# Patient Record
Sex: MALE | Race: WHITE | NOT HISPANIC OR LATINO | Employment: OTHER | URBAN - METROPOLITAN AREA
[De-identification: names, ages, dates, MRNs, and addresses within clinical notes are randomized per-mention and may not be internally consistent; named-entity substitution may affect disease eponyms.]

---

## 2017-08-17 ENCOUNTER — NEW PATIENT (OUTPATIENT)
Dept: URBAN - METROPOLITAN AREA CLINIC 11 | Facility: CLINIC | Age: 72
End: 2017-08-17

## 2017-08-17 VITALS — HEIGHT: 60 IN | DIASTOLIC BLOOD PRESSURE: 60 MMHG | SYSTOLIC BLOOD PRESSURE: 138 MMHG

## 2017-08-17 ASSESSMENT — VISUAL ACUITY
OD_CC: 20/30-2
OS_CC: 20/25

## 2017-08-17 ASSESSMENT — TONOMETRY
OD_IOP_MMHG: 14
OS_IOP_MMHG: 15

## 2017-12-08 NOTE — PATIENT DISCUSSION
ANGELICA OU:  PRESCRIBED UV PROTECTION TO SLOW GROWTH. PRESCRIBE ARTIFICAL TEARS TO INCREASE COMFORT.

## 2017-12-08 NOTE — PATIENT DISCUSSION
[phone]/20&nbsp;SN &nbsp; &nbsp; clk

## 2018-12-20 NOTE — PATIENT DISCUSSION
[phone] -2&nbsp;SN &nbsp; &nbsp; clk

## 2020-02-05 NOTE — PATIENT DISCUSSION
[place]  for [facility]+[phone]&nbsp;SN &nbsp; &nbsp; cld

## 2020-05-18 ENCOUNTER — IMPORTED ENCOUNTER (OUTPATIENT)
Dept: URBAN - METROPOLITAN AREA CLINIC 9 | Facility: CLINIC | Age: 75
End: 2020-05-18

## 2020-07-28 ENCOUNTER — IMPORTED ENCOUNTER (OUTPATIENT)
Dept: URBAN - METROPOLITAN AREA CLINIC 9 | Facility: CLINIC | Age: 75
End: 2020-07-28

## 2020-08-13 ENCOUNTER — IMPORTED ENCOUNTER (OUTPATIENT)
Dept: URBAN - METROPOLITAN AREA CLINIC 9 | Facility: CLINIC | Age: 75
End: 2020-08-13

## 2020-08-19 ENCOUNTER — IMPORTED ENCOUNTER (OUTPATIENT)
Dept: URBAN - METROPOLITAN AREA CLINIC 9 | Facility: CLINIC | Age: 75
End: 2020-08-19

## 2020-09-14 ENCOUNTER — IMPORTED ENCOUNTER (OUTPATIENT)
Dept: URBAN - METROPOLITAN AREA CLINIC 9 | Facility: CLINIC | Age: 75
End: 2020-09-14

## 2020-10-19 ENCOUNTER — IMPORTED ENCOUNTER (OUTPATIENT)
Dept: URBAN - METROPOLITAN AREA CLINIC 9 | Facility: CLINIC | Age: 75
End: 2020-10-19

## 2021-05-06 ENCOUNTER — IMPORTED ENCOUNTER (OUTPATIENT)
Dept: URBAN - METROPOLITAN AREA CLINIC 9 | Facility: CLINIC | Age: 76
End: 2021-05-06

## 2021-06-17 ENCOUNTER — IMPORTED ENCOUNTER (OUTPATIENT)
Dept: URBAN - METROPOLITAN AREA CLINIC 9 | Facility: CLINIC | Age: 76
End: 2021-06-17

## 2021-06-17 PROBLEM — Z96.1: Noted: 2021-06-17

## 2021-10-11 ENCOUNTER — FOLLOW UP (OUTPATIENT)
Dept: URBAN - METROPOLITAN AREA CLINIC 6 | Facility: CLINIC | Age: 76
End: 2021-10-11

## 2021-10-11 DIAGNOSIS — H16.223: ICD-10-CM

## 2021-10-11 DIAGNOSIS — Z96.1: ICD-10-CM

## 2021-10-11 DIAGNOSIS — H43.811: ICD-10-CM

## 2021-10-11 PROCEDURE — 92014 COMPRE OPH EXAM EST PT 1/>: CPT

## 2021-10-11 ASSESSMENT — TONOMETRY
OS_IOP_MMHG: 11
OD_IOP_MMHG: 14

## 2021-10-16 ASSESSMENT — VISUAL ACUITY
OD_SC: 20/25 - SN
OD_CC: 20/30 -2 SN
OS_CC: 20/20 SN
OD_SC: 20/40 SN
OS_CC: 20/20 - SN
OS_SC: 20/25 -2 SN
OS_CC: 20/25 -2 SN
OD_CC: 20/40 -2 SN
OS_CC: 20/30 -2 SN
OD_CC: 20/20 SN
OD_SC: 20/80 SN
OD_SC: 20/25 -2 SN
OD_CC: 20/25 -2 SN
OD_CC: 20/30 SN
OD_CC: 20/20 -3 SN
OS_CC: 20/30 SN
OD_PH: 20/30 SN

## 2021-10-16 ASSESSMENT — TONOMETRY
OD_IOP_MMHG: 9
OS_IOP_MMHG: 8
OD_IOP_MMHG: 12
OD_IOP_MMHG: 8
OD_IOP_MMHG: 7
OS_IOP_MMHG: 6
OS_IOP_MMHG: 12
OD_IOP_MMHG: 13
OS_IOP_MMHG: 6
OD_IOP_MMHG: 10
OS_IOP_MMHG: 10
OD_IOP_MMHG: 6

## 2022-04-04 NOTE — PATIENT DISCUSSION
PM[facility] discussed Lasik with patient, will refer to Dr [facility] for evaluation.

## 2022-06-20 RX ORDER — ALLOPURINOL 100 MG/1
TABLET ORAL
COMMUNITY

## 2022-06-20 RX ORDER — ROSUVASTATIN CALCIUM 40 MG/1
TABLET, COATED ORAL
COMMUNITY

## 2022-06-20 RX ORDER — PREGABALIN 25 MG/1
1 CAPSULE ORAL
COMMUNITY
Start: 2022-03-23 | End: 2022-09-21

## 2022-06-20 RX ORDER — AMLODIPINE BESYLATE 5 MG/1
TABLET ORAL
COMMUNITY

## 2022-06-20 RX ORDER — DOXAZOSIN MESYLATE 4 MG/1
TABLET ORAL
COMMUNITY

## 2022-06-20 RX ORDER — PREDNISONE 1 MG/1
TABLET ORAL
COMMUNITY
End: 2022-09-21

## 2022-06-20 RX ORDER — AMOXICILLIN 500 MG/1
4 CAPSULE ORAL
COMMUNITY
End: 2022-09-21

## 2022-06-20 RX ORDER — GLIMEPIRIDE 4 MG/1
TABLET ORAL
COMMUNITY
End: 2022-09-21

## 2022-06-20 RX ORDER — MONTELUKAST SODIUM 10 MG/1
TABLET ORAL
COMMUNITY
End: 2022-09-21

## 2022-06-20 RX ORDER — ISOSORBIDE MONONITRATE 60 MG/1
TABLET, EXTENDED RELEASE ORAL
COMMUNITY
End: 2022-09-21 | Stop reason: SDUPTHER

## 2022-06-20 RX ORDER — CLONAZEPAM 1 MG/1
TABLET ORAL
COMMUNITY

## 2022-06-20 RX ORDER — METOLAZONE 2.5 MG/1
TABLET ORAL
COMMUNITY

## 2022-06-20 RX ORDER — POTASSIUM CHLORIDE 20 MEQ/1
TABLET, EXTENDED RELEASE ORAL
COMMUNITY

## 2022-06-20 RX ORDER — FUROSEMIDE 40 MG/1
TABLET ORAL
COMMUNITY

## 2022-09-21 PROBLEM — I48.20 CHRONIC ATRIAL FIBRILLATION (HCC): Status: ACTIVE | Noted: 2022-09-21

## 2022-09-21 PROBLEM — D47.2 MONOCLONAL GAMMOPATHY OF UNKNOWN SIGNIFICANCE (MGUS): Status: ACTIVE | Noted: 2022-09-21

## 2022-09-21 PROBLEM — E11.9 TYPE 2 DIABETES MELLITUS WITHOUT COMPLICATION (HCC): Status: RESOLVED | Noted: 2022-09-21 | Resolved: 2022-09-21

## 2022-09-21 PROBLEM — E11.9 TYPE 2 DIABETES MELLITUS WITHOUT COMPLICATION (HCC): Status: ACTIVE | Noted: 2022-09-21

## 2022-09-21 PROBLEM — M10.9 GOUT: Status: ACTIVE | Noted: 2022-09-21

## 2022-09-21 PROBLEM — K21.9 GASTROESOPHAGEAL REFLUX DISEASE WITHOUT ESOPHAGITIS: Status: ACTIVE | Noted: 2022-09-21

## 2022-09-21 PROBLEM — I10 ESSENTIAL HYPERTENSION: Status: ACTIVE | Noted: 2022-09-21

## 2022-09-21 PROBLEM — N18.4 STAGE 4 CHRONIC KIDNEY DISEASE (HCC): Status: ACTIVE | Noted: 2022-09-21

## 2022-09-21 PROBLEM — I25.119 ATHEROSCLEROTIC HEART DISEASE OF NATIVE CORONARY ARTERY WITH UNSPECIFIED ANGINA PECTORIS (HCC): Status: ACTIVE | Noted: 2022-09-21

## 2022-09-21 PROBLEM — E78.2 MIXED HYPERLIPIDEMIA: Status: ACTIVE | Noted: 2022-09-21

## 2022-09-21 PROBLEM — Z95.2 S/P TAVR (TRANSCATHETER AORTIC VALVE REPLACEMENT): Status: ACTIVE | Noted: 2022-09-21

## 2022-09-21 PROBLEM — E11.22 TYPE 2 DIABETES MELLITUS WITH CHRONIC KIDNEY DISEASE (HCC): Status: ACTIVE | Noted: 2022-09-21

## 2022-09-21 PROBLEM — R26.81 GAIT INSTABILITY: Status: ACTIVE | Noted: 2022-09-21

## 2022-09-21 PROBLEM — G47.33 OSA (OBSTRUCTIVE SLEEP APNEA): Status: ACTIVE | Noted: 2022-09-21

## 2022-09-21 PROBLEM — I25.5 ISCHEMIC CARDIOMYOPATHY: Status: ACTIVE | Noted: 2022-09-21

## 2022-09-21 PROBLEM — I25.10 CORONARY ATHEROSCLEROSIS: Status: ACTIVE | Noted: 2022-09-21

## 2022-09-21 PROBLEM — E11.40 TYPE 2 DIABETES MELLITUS WITH DIABETIC NEUROPATHY (HCC): Status: ACTIVE | Noted: 2022-09-21

## 2022-09-21 PROBLEM — I50.9 CHRONIC CONGESTIVE HEART FAILURE (HCC): Status: ACTIVE | Noted: 2022-09-21

## 2022-09-21 PROBLEM — N40.0 BENIGN PROSTATIC HYPERPLASIA: Status: ACTIVE | Noted: 2022-09-21

## 2022-09-21 PROBLEM — F17.201 NICOTINE DEPENDENCE IN REMISSION: Status: ACTIVE | Noted: 2022-09-21

## 2022-10-20 PROBLEM — I35.0 AORTIC VALVE STENOSIS: Status: ACTIVE | Noted: 2022-10-20

## 2022-10-25 ENCOUNTER — FOLLOW UP (OUTPATIENT)
Dept: URBAN - METROPOLITAN AREA CLINIC 6 | Facility: CLINIC | Age: 77
End: 2022-10-25

## 2022-10-25 DIAGNOSIS — H16.223: ICD-10-CM

## 2022-10-25 DIAGNOSIS — Z96.1: ICD-10-CM

## 2022-10-25 DIAGNOSIS — H04.123: ICD-10-CM

## 2022-10-25 DIAGNOSIS — H43.811: ICD-10-CM

## 2022-10-25 DIAGNOSIS — E11.9: ICD-10-CM

## 2022-10-25 PROCEDURE — 92014 COMPRE OPH EXAM EST PT 1/>: CPT

## 2022-10-25 PROCEDURE — 92015 DETERMINE REFRACTIVE STATE: CPT

## 2022-10-25 ASSESSMENT — VISUAL ACUITY
OU_CC: 20/30+2
OS_CC: 20/25-1
OD_CC: 20/40-1

## 2022-10-25 ASSESSMENT — TONOMETRY
OD_IOP_MMHG: 11
OS_IOP_MMHG: 12

## 2024-07-23 NOTE — PATIENT DISCUSSION
Anesthesia Pre Eval Note    Anesthesia ROS/Med Hx    Overall Review:  EKG was reviewed and Echo was reviewed       Neuro/Psych Review:       Positive for neuromuscular disease - cervical disease  Positive for psychiatric history - Anxiety    GI/HEPATIC/RENAL Review:     Positive for GERD  Positive for renal disease - nephrolithiasis    End/Other Review:    Positive for chronic pain  Additional Results:  EKG:  Encounter Date: 06/06/24  -Electrocardiogram 12-Lead:        Result                      Value                           Ventricular Rate EKG/M*     59                              Atrial Rate (BPM)           59                              SC-Interval (MSEC)          166                             QRS-Interval (MSEC)         68                              QT-Interval (MSEC)          426                             QTc                         422                             P Axis (Degrees)            44                              R Axis (Degrees)            -10                             T Axis (Degrees)            47                              REPORT TEXT                                             Sinus bradycardia   Low voltage QRS   Cannot rule out   Anterior infarct   , age undetermined   Abnormal ECG   When compared with ECG of   22-JAN-2024 12:33,   Questionable change in   QRS axis   Confirmed by JESSA CONTRERAS, [facility] ([id number]) on 6/6/2024 11:01:12 AM    Echo:  Ejection Fraction       Date                     Value               Ref Range           Status                03/10/2023               59                  %                   Final            ----------    Relevant Problems   No relevant active problems       Physical Exam     Airway   Mallampati: III  TM Distance: <3 FB  Neck: Stiffness and Short  TMJ Mobility: Good    Cardiovascular    Cardio Rhythm: Regular  Cardio Rate: Normal    Head Assessment  Head assessment: Normocephalic and Atraumatic    General Assessment  General Assessment: No acute  Quantity:OD4&nbsp;Box(es) distress    Dental Exam  Dental exam normal    Pulmonary Exam    Breath sounds clear to auscultation:  Yes    Anesthesia Plan:  No phone call attempted due to:  Anesthesia Schedule Change    ASA Status: 2  Anesthesia Type: General    Induction: Intravenous  Preferred Airway Type: ETT  Patient does not have a difficult airway or is not at risk of aspiration.   Maintenance: TIVA and Inhalational  Premedication: IV    Patient does not have an implantable electronic device requiring post procedure programming.     Post-op Pain Management: Per Surgeon      Checklist  Reviewed: Lab Results, Patient Summary, Allergies, Medications, Beta Blocker Status, EKG, Chest X-Rays, Outside Records, Problem list, Past Med History, Care Everywhere, Nursing Notes, Consultations, NPO Status and DNR Status  Consent/Risks Discussed Statement:  The proposed anesthetic plan, including its risks and benefits, have been discussed with the Patient along with the risks and benefits of alternatives. Questions were encouraged and answered and the patient and/or representative understands and agrees to proceed.    I have discussed elements of the patient's history or examination, as noted above and/or as follows, that place the patient at higher risk of complications; neurological disease and kidney disease.    I discussed with the patient (and/or patient's legal representative) the risks and benefits of the proposed anesthesia plan, General, which may include services performed by other anesthesia providers.    Alternative anesthesia plans, if available, were reviewed with the patient (and/or patient's legal representative). Discussion has been held with the patient (and/or patient's legal representative) regarding risks of anesthesia, which include allergic reaction, anxiety, aspiration, bleeding/hematoma, back pain, depressed breathing, organ damage, oral injury, nerve injury, nausea, memory loss, intra-operative awareness, infection,  hypotension, headache, persistent pain, post-op intubation, eye injury, sore throat and vomiting and emergent situations that may require change in anesthesia plan.    The patient (and/or patient's legal representative) has indicated understanding, his/her questions have been answered, and he/she wishes to proceed with the planned anesthetic.    Blood Products: Not Anticipated